# Patient Record
Sex: FEMALE | Race: WHITE | NOT HISPANIC OR LATINO | Employment: FULL TIME | URBAN - METROPOLITAN AREA
[De-identification: names, ages, dates, MRNs, and addresses within clinical notes are randomized per-mention and may not be internally consistent; named-entity substitution may affect disease eponyms.]

---

## 2017-01-27 ENCOUNTER — OFFICE VISIT (OUTPATIENT)
Dept: MEDICAL GROUP | Facility: PHYSICIAN GROUP | Age: 47
End: 2017-01-27
Payer: COMMERCIAL

## 2017-01-27 VITALS
HEART RATE: 88 BPM | OXYGEN SATURATION: 98 % | BODY MASS INDEX: 26.98 KG/M2 | SYSTOLIC BLOOD PRESSURE: 108 MMHG | WEIGHT: 158 LBS | DIASTOLIC BLOOD PRESSURE: 62 MMHG | TEMPERATURE: 98.5 F | HEIGHT: 64 IN | RESPIRATION RATE: 14 BRPM

## 2017-01-27 DIAGNOSIS — F33.42 RECURRENT MAJOR DEPRESSIVE DISORDER, IN FULL REMISSION (HCC): ICD-10-CM

## 2017-01-27 DIAGNOSIS — Z12.39 SCREENING FOR BREAST CANCER: ICD-10-CM

## 2017-01-27 PROCEDURE — 99214 OFFICE O/P EST MOD 30 MIN: CPT | Performed by: FAMILY MEDICINE

## 2017-01-27 RX ORDER — BUPROPION HYDROCHLORIDE 75 MG/1
TABLET ORAL
Qty: 90 TAB | Refills: 3 | Status: SHIPPED | OUTPATIENT
Start: 2017-01-27

## 2017-01-27 RX ORDER — FLUOXETINE HYDROCHLORIDE 20 MG/1
20 CAPSULE ORAL DAILY
Qty: 90 CAP | Refills: 3 | Status: SHIPPED | OUTPATIENT
Start: 2017-01-27

## 2017-01-27 ASSESSMENT — PATIENT HEALTH QUESTIONNAIRE - PHQ9: CLINICAL INTERPRETATION OF PHQ2 SCORE: 1

## 2017-01-27 NOTE — PROGRESS NOTES
Chief Complaint   Patient presents with   • Establish Care   • Medication Refill       HISTORY OF PRESENT ILLNESS: Patient is a 46 y.o. female new patient who presents today to discuss the following issues:    1. 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 Previously was followed by [facility]. Reports that she's been doing quite well using fluoxetine 20 mg daily and bupropion 75 mg daily for a number of years. She reports that she will be needing her annual refill. No adverse effects noted from taking the medication. She will on occasion use a half a tablet of Ambien for restless sleep. She does not need a refill on this today. She denies any SI or HI. Appetite energy level and engagement all are appropriate.    2. Screening for breast cancer  Due for mammography.    We'll be having follow-up with her gynecologist for Pap      Active Ambulatory Problems     Diagnosis Date Noted   • Depression    • IUD (intrauterine device) in place 01/11/2012   • Aphthous ulcer of tongue 10/15/2012     Resolved Ambulatory Problems     Diagnosis Date Noted   • No Resolved Ambulatory Problems     Past Medical History   Diagnosis Date   • Hyperhidrosis        Allergies:Review of patient's allergies indicates no known allergies.    Current Outpatient Prescriptions   Medication Sig Dispense Refill   • fluoxetine (PROZAC) 20 MG Cap Take 1 Cap by mouth every day. 90 Cap 3   • buPROPion (WELLBUTRIN) 75 MG Tab TAKE ONE TABLET BY MOUTH ONCE DAILY AT  6  PM 90 Tab 3   • zolpidem (AMBIEN) 10 MG Tab Take 1 Tab by mouth at bedtime as needed for Sleep. TAKE ONE TABLET BY MOUTH EVERY DAY AT BEDTIME AS NEEDED FOR SLEEP  Indications: Trouble Sleeping 30 Each 4   • MIRENA 20 MCG/24HR IU IUD 1 Each by Intrauterine route Once. 8/2011       No current facility-administered medications for this visit.       Social History   Substance Use Topics   •  "Smoking status: Never Smoker    • Smokeless tobacco: Never Used   • Alcohol Use: 0.0 oz/week     0 Standard drinks or equivalent per week      Comment: rare - few times per year       Family Status   Relation Status Death Age   • Father  49     acute pancreatitis/gallstones     Family History   Problem Relation Age of Onset   • Hypertension Father    • Diabetes Son      Type 1     Health Maintenance Due   Topic Date Due   • MAMMOGRAM  2015   • PAP SMEAR  2017       ROS:  Review of Systems   Constitutional: Negative for fever, chills, weight loss and malaise/fatigue.   HENT: Negative for ear pain, nosebleeds, congestion, sore throat and neck pain.    Eyes: Negative for blurred vision.   Respiratory: Negative for cough, sputum production, shortness of breath and wheezing.    Cardiovascular: Negative for chest pain, palpitations, orthopnea and leg swelling.   Gastrointestinal: Negative for heartburn, nausea, vomiting and abdominal pain.   Genitourinary: Negative for dysuria, urgency and frequency.   Musculoskeletal: Negative for myalgias, back pain and joint pain.   Skin: Negative for rash and itching.   Neurological: Negative for dizziness, tingling, tremors, sensory change, focal weakness and headaches.   Endo/Heme/Allergies: Does not bruise/bleed easily.   Psychiatric/Behavioral: Negative for depression, suicidal ideas and memory loss.  The patient is not nervous/anxious and does not have insomnia.      Exam:  Blood pressure 108/62, pulse 88, temperature 36.9 °C (98.5 °F), resp. rate 14, height 1.626 m (5' 4\"), weight 71.668 kg (158 lb), SpO2 98 %.  General:  Well nourished, well developed female in NAD  EYES: EOMI.  Conjunctiva clear.    Neck: Supple without JVD or bruit. Thyroid is not enlarged.  Pulmonary: Clear to ausculation and percussion.  Normal effort. No rales, ronchi, or wheezing.  Cardiovascular: Regular rate and rhythm without murmur, no peripheral edema  MSK: normal ROM in upper and " lower extremities bilaterally  Psych: appropriate affect and concentration, oriented to person and place  Neuro: no unilateral weakness in upper or lower extremities.  No gait disturbance    Please note that this dictation was created using voice recognition software. I have made every reasonable attempt to correct obvious errors, but I expect that there are errors of grammar and possibly content that I did not discover before finalizing the note.    Assessment/Plan:  1. Recurrent major depressive disorder, in full remission (CMS-HCC)  Continue  - fluoxetine (PROZAC) 20 MG Cap; Take 1 Cap by mouth every day.  Dispense: 90 Cap; Refill: 3  - buPROPion (WELLBUTRIN) 75 MG Tab; TAKE ONE TABLET BY MOUTH ONCE DAILY AT  6  PM  Dispense: 90 Tab; Refill: 3    2. Screening for breast cancer    - MA-SCREEN MAMMO W/CAD-BILAT; Future      No Follow-up on file.

## 2017-01-27 NOTE — MR AVS SNAPSHOT
"        Dasha Alarcon   2017 2:20 PM   Office Visit   MRN: [id number]    Department:  [facility]   Dept Phone:  [phone]    Description:  Female : 1970   Provider:  Jessica Emmanuel M.D.           Reason for Visit     Establish Care     Medication Refill           Allergies as of 2017     No Known Allergies      You were diagnosed with     Screening for breast cancer   [[id number]]       Recurrent major depressive disorder, in full remission (CMS-[facility])   [[id number]]         Vital Signs     Blood Pressure Pulse Temperature Respirations Height Weight    108/62 mmHg 88 36.9 °C (98.5 °F) 14 1.626 m (5' 4\") 71.668 kg (158 lb)    Body Mass Index Oxygen Saturation Smoking Status             27.11 kg/m2 98% Never Smoker          Basic Information     Date Of Birth Sex Race Ethnicity Preferred Language    1970 Female White Non- English      Problem List              ICD-10-CM Priority Class Noted - Resolved    Depression F32.9   Unknown - Present    IUD (intrauterine device) in place Z97.5   2012 - Present    Aphthous ulcer of tongue K12.0   10/15/2012 - Present      Health Maintenance        Date Due Completion Dates    MAMMOGRAM 2015, 2013    PAP SMEAR 2017 (Done)    Override on 2014: Done    IMM DTaP/Tdap/Td Vaccine (2 - Td) 2026            Current Immunizations     Influenza TIV (IM) 10/31/2012    Influenza Vaccine Quad Inj (Pf) 10/18/2016, 10/5/2015  8:00 AM, 10/9/2013    Influenza Vaccine Quad Inj (Preserved) 10/16/2014    MMR Vaccine 2003    Tdap Vaccine 2016    Tetanus Vaccine 2006    Tuberculin Skin Test 2013 12:25 PM, 2012  6:30 AM, 2011, 2009      Below and/or attached are the medications your provider expects you to take. Review all of your home medications and newly ordered medications with your provider and/or pharmacist. Follow medication instructions as directed by your " provider and/or pharmacist. Please keep your medication list with you and share with your provider. Update the information when medications are discontinued, doses are changed, or new medications (including over-the-counter products) are added; and carry medication information at all times in the event of emergency situations     Allergies:  No Known Allergies          Medications  Valid as of: January 27, 2017 -  2:32 PM    Generic Name Brand Name Tablet Size Instructions for use    BuPROPion HCl (Tab) WELLBUTRIN 75 MG TAKE ONE TABLET BY MOUTH ONCE DAILY AT  6  PM        FLUoxetine HCl (Cap) PROZAC 20 MG Take 1 Cap by mouth every day.        Levonorgestrel (IUD) MIRENA (52 MG) 20 MCG/24HR 1 Each by Intrauterine route Once. 8/2011        Zolpidem Tartrate (Tab) AMBIEN 10 MG Take 1 Tab by mouth at bedtime as needed for Sleep. TAKE ONE TABLET BY MOUTH EVERY DAY AT BEDTIME AS NEEDED FOR SLEEP  Indications: Trouble Sleeping        .                 Medicines prescribed today were sent to:     [facility] [address] [place] [id number]    Phone: [phone] Fax: [phone]    Open 24 Hours?: No      Medication refill instructions:       If your prescription bottle indicates you have medication refills left, it is not necessary to call your provider’s office. Please contact your pharmacy and they will refill your medication.    If your prescription bottle indicates you do not have any refills left, you may request refills at any time through one of the following ways: The online artandseek system (except Urgent Care), by calling your provider’s office, or by asking your pharmacy to contact your provider’s office with a refill request. Medication refills are processed only during regular business hours and may not be available until the next business day. Your provider may request additional information or to have a follow-up visit with you prior to refilling your  medication.   *Please Note: Medication refills are assigned a new Rx number when refilled electronically. Your pharmacy may indicate that no refills were authorized even though a new prescription for the same medication is available at the pharmacy. Please request the medicine by name with the pharmacy before contacting your provider for a refill.        Your To Do List     Future Labs/Procedures Complete By Expires    MA-SCREEN MAMMO W/CAD-BILAT  As directed 1/27/2018         [employer] Access Code: Activation code not generated  Current [employer] Status: Active